# Patient Record
Sex: MALE | Employment: FULL TIME | URBAN - NONMETROPOLITAN AREA
[De-identification: names, ages, dates, MRNs, and addresses within clinical notes are randomized per-mention and may not be internally consistent; named-entity substitution may affect disease eponyms.]

---

## 2022-12-16 ENCOUNTER — OFFICE VISIT (OUTPATIENT)
Dept: URGENT CARE | Facility: PHYSICIAN GROUP | Age: 22
End: 2022-12-16
Payer: COMMERCIAL

## 2022-12-16 VITALS
WEIGHT: 176 LBS | SYSTOLIC BLOOD PRESSURE: 100 MMHG | HEART RATE: 69 BPM | OXYGEN SATURATION: 98 % | RESPIRATION RATE: 16 BRPM | DIASTOLIC BLOOD PRESSURE: 62 MMHG | TEMPERATURE: 98.8 F | BODY MASS INDEX: 23.84 KG/M2 | HEIGHT: 72 IN

## 2022-12-16 DIAGNOSIS — H92.03 OTALGIA OF BOTH EARS: ICD-10-CM

## 2022-12-16 DIAGNOSIS — H61.23 BILATERAL IMPACTED CERUMEN: ICD-10-CM

## 2022-12-16 PROCEDURE — 99203 OFFICE O/P NEW LOW 30 MIN: CPT | Performed by: PHYSICIAN ASSISTANT

## 2022-12-16 ASSESSMENT — ENCOUNTER SYMPTOMS
HEADACHES: 0
FEVER: 0
VOMITING: 0
EYE DISCHARGE: 0
EYE REDNESS: 0
MYALGIAS: 0
NAUSEA: 0
COUGH: 0
SORE THROAT: 0
DIARRHEA: 0

## 2022-12-17 NOTE — PROGRESS NOTES
Subjective     Reji Phan is a 22 y.o. male who presents with Otalgia            Otalgia   There is pain in both (left > right) ears. Episode onset: x 1 day ago. The problem occurs constantly. The problem has been gradually worsening. There has been no fever. Pertinent negatives include no coughing, diarrhea, ear discharge, headaches, sore throat or vomiting. He has tried nothing for the symptoms.       PMH:  has no past medical history on file.  MEDS: No current outpatient medications on file.  ALLERGIES: No Known Allergies  SURGHX: No past surgical history on file.  SOCHX:  reports that he has been smoking cigarettes. He has never used smokeless tobacco. He reports current alcohol use. He reports that he does not use drugs.  FH: Family history was reviewed, no pertinent findings to report    Review of Systems   Constitutional:  Negative for fever.   HENT:  Positive for congestion and ear pain. Negative for ear discharge and sore throat.    Eyes:  Negative for discharge and redness.   Respiratory:  Negative for cough.    Gastrointestinal:  Negative for diarrhea, nausea and vomiting.   Musculoskeletal:  Negative for myalgias.   Neurological:  Negative for headaches.            Objective     /62   Pulse 69   Temp 37.1 °C (98.8 °F) (Temporal)   Resp 16   Ht 1.829 m (6')   Wt 79.8 kg (176 lb)   SpO2 98%   BMI 23.87 kg/m²      Physical Exam  Constitutional:       General: He is not in acute distress.     Appearance: Normal appearance. He is not ill-appearing.   HENT:      Head: Normocephalic and atraumatic.      Right Ear: External ear normal. There is impacted cerumen.      Left Ear: External ear normal. There is impacted cerumen.      Nose: Nose normal.      Mouth/Throat:      Mouth: Mucous membranes are moist.      Pharynx: Oropharynx is clear. No posterior oropharyngeal erythema.   Eyes:      Extraocular Movements: Extraocular movements intact.      Conjunctiva/sclera: Conjunctivae normal.    Cardiovascular:      Rate and Rhythm: Normal rate and regular rhythm.      Heart sounds: Normal heart sounds.   Pulmonary:      Effort: Pulmonary effort is normal. No respiratory distress.      Breath sounds: Normal breath sounds. No wheezing.   Musculoskeletal:         General: Normal range of motion.      Cervical back: Normal range of motion and neck supple.   Skin:     General: Skin is warm and dry.   Neurological:      Mental Status: He is alert and oriented to person, place, and time.             Progress:  Cerumen Removal:  Successful removal of cerumen from the patient's bilateral ear canals via lavage.  On re-examination, the patient's bilateral TMs are clear without erythema or signs of infection.  The patient reports mild improvement of his symptoms following the cerumen removal.             Assessment & Plan          1. Otalgia of both ears    2. Bilateral impacted cerumen    Differential diagnoses, supportive care, and indications for immediate follow-up discussed with patient.   Instructed to return to clinic or nearest emergency department for any change in condition, further concerns, or worsening of symptoms.    OTC Tylenol or Motrin for fever/discomfort.  Drink plenty of fluids  Follow-up with PCP  Return to clinic or go to the ED if symptoms worsen or fail to improve, or if the patient should develop worsening/increasing ear pain, drainage from the affected ear, cough, congestion, sore throat, fever/chills, and/or any concerning symptoms.    Discussed plan with the patient, and he agrees to the above.    I personally reviewed prior external notes and test results pertinent to today's visit.  I have independently reviewed and interpreted all diagnostics ordered during this urgent care visit.     Please note that this dictation was created using voice recognition software. I have made every reasonable attempt to correct obvious errors, but I expect that there may be errors of grammar and possibly  content that I did not discover before finalizing the note.     This note was electronically signed by lAexa Boyer PA-C

## 2024-05-16 ENCOUNTER — HOSPITAL ENCOUNTER (EMERGENCY)
Age: 24
Discharge: HOME | End: 2024-05-17

## 2024-05-16 VITALS
WEIGHT: 185 LBS | HEIGHT: 72 IN | OXYGEN SATURATION: 97 % | BODY MASS INDEX: 25.06 KG/M2 | DIASTOLIC BLOOD PRESSURE: 69 MMHG | RESPIRATION RATE: 16 BRPM | SYSTOLIC BLOOD PRESSURE: 133 MMHG | HEART RATE: 62 BPM | TEMPERATURE: 98.2 F

## 2024-05-16 DIAGNOSIS — T16.1XXA FOREIGN BODY OF RIGHT EAR, INITIAL ENCOUNTER: Primary | ICD-10-CM

## 2024-05-16 PROCEDURE — 99283 EMERGENCY DEPT VISIT LOW MDM: CPT

## 2024-05-16 PROCEDURE — 69200 CLEAR OUTER EAR CANAL: CPT

## 2024-05-16 ASSESSMENT — PAIN SCALES - GENERAL: PAINLEVEL_OUTOF10: 0

## 2024-05-16 ASSESSMENT — PAIN - FUNCTIONAL ASSESSMENT: PAIN_FUNCTIONAL_ASSESSMENT: NONE - DENIES PAIN

## 2024-05-17 PROCEDURE — 69200 CLEAR OUTER EAR CANAL: CPT

## 2024-05-17 RX ORDER — OFLOXACIN 3 MG/ML
5 SOLUTION AURICULAR (OTIC) 2 TIMES DAILY
Qty: 5 ML | Refills: 0 | Status: SHIPPED | OUTPATIENT
Start: 2024-05-17 | End: 2024-05-24

## 2024-05-17 NOTE — ED PROVIDER NOTES
[facility]  ED  EMERGENCY DEPARTMENT ENCOUNTER        Pt Name: David Jasso  MRN: [id number]  Birthdate 2000  Date of evaluation: 5/16/2024  Provider: BARNEY Man  PCP: No primary care provider on file.  Note Started: 12:10 AM EDT 5/17/24      DAVEY. I have evaluated this patient.        CHIEF COMPLAINT       Chief Complaint   Patient presents with    Foreign Body in Ear     Pt states he was cleaning the wax out of his right ear - Qtip broke off and stuck.       HISTORY OF PRESENT ILLNESS: 1 or more Elements     History from : Patient    Limitations to history : None    David Jasso is a 24 y.o. male who presents to the emergency department for foreign body in right ear.  Patient states he was cleaning the wax out of his right ear when the Q-tip broke off and has been stuck ever since. He states he has copious amounts of ear wax and drainage at baseline. No other concerns.    Nursing Notes were all reviewed and agreed with or any disagreements were addressed in the HPI.    REVIEW OF SYSTEMS :      Review of Systems    Positives and Pertinent negatives as per HPI.     SURGICAL HISTORY   History reviewed. No pertinent surgical history.    CURRENTMEDICATIONS       Previous Medications    No medications on file       ALLERGIES     Egg solids, whole    FAMILYHISTORY     History reviewed. No pertinent family history.     SOCIAL HISTORY       Social History     Tobacco Use    Smoking status: Every Day     Current packs/day: 0.25     Types: Cigarettes    Smokeless tobacco: Current     Types: Chew    Tobacco comments:     Rare - chews once monthly   Vaping Use    Vaping Use: Every day   Substance Use Topics    Alcohol use: Yes     Comment: occ    Drug use: Never       SCREENINGS        [place] Coma Scale  Eye Opening: Spontaneous  Best Verbal Response: Oriented  Best Motor Response: Obeys commands  [place] Coma Scale Score: 15                CIWA Assessment  BP:

## 2024-05-17 NOTE — DISCHARGE INSTRUCTIONS
You were seen in the emergency department today for foreign body in your right ear.  It was removed.  You do have significant drainage so I have prescribed antibiotic drops for concern of possible otitis externa.  Please follow-up with ENT as needed for further evaluation and treatment.